# Patient Record
Sex: FEMALE | Race: WHITE
[De-identification: names, ages, dates, MRNs, and addresses within clinical notes are randomized per-mention and may not be internally consistent; named-entity substitution may affect disease eponyms.]

---

## 2017-06-08 ENCOUNTER — HOSPITAL ENCOUNTER (INPATIENT)
Dept: HOSPITAL 11 - JP.ED | Age: 32
LOS: 3 days | Discharge: HOME | DRG: 221 | End: 2017-06-11
Attending: SURGERY | Admitting: SURGERY
Payer: COMMERCIAL

## 2017-06-08 DIAGNOSIS — Z87.19: ICD-10-CM

## 2017-06-08 DIAGNOSIS — K35.3: Primary | ICD-10-CM

## 2017-06-08 PROCEDURE — C9113 INJ PANTOPRAZOLE SODIUM, VIA: HCPCS

## 2017-06-08 PROCEDURE — S0073 INJECTION, AZTREONAM, 500 MG: HCPCS

## 2017-06-09 PROCEDURE — 0DTJ4ZZ RESECTION OF APPENDIX, PERCUTANEOUS ENDOSCOPIC APPROACH: ICD-10-PCS | Performed by: SURGERY

## 2017-06-09 PROCEDURE — 0DBH4ZZ EXCISION OF CECUM, PERCUTANEOUS ENDOSCOPIC APPROACH: ICD-10-PCS | Performed by: SURGERY

## 2017-06-09 PROCEDURE — 0W9G4ZX DRAINAGE OF PERITONEAL CAVITY, PERCUTANEOUS ENDOSCOPIC APPROACH, DIAGNOSTIC: ICD-10-PCS | Performed by: SURGERY

## 2017-06-09 RX ADMIN — AZTREONAM SCH MLS/HR: 1 INJECTION, SOLUTION INTRAVENOUS at 14:53

## 2017-06-09 RX ADMIN — AMPICILLIN SODIUM AND SULBACTAM SODIUM SCH MLS/HR: 2; 1 INJECTION, POWDER, FOR SOLUTION INTRAMUSCULAR; INTRAVENOUS at 12:03

## 2017-06-09 RX ADMIN — AMPICILLIN SODIUM AND SULBACTAM SODIUM SCH MLS/HR: 2; 1 INJECTION, POWDER, FOR SOLUTION INTRAMUSCULAR; INTRAVENOUS at 18:03

## 2017-06-09 RX ADMIN — AZTREONAM SCH MLS/HR: 1 INJECTION, SOLUTION INTRAVENOUS at 21:20

## 2017-06-09 NOTE — EDM.PDOC
ED HPI GENERAL MEDICAL PROBLEM





- General


Chief Complaint: Abdominal Pain


Stated Complaint: RT LOWER ABD PAIN


Time Seen by Provider: 06/09/17 00:50


Source of Information: Reports: Patient


History Limitations: Reports: No Limitations





- History of Present Illness


INITIAL COMMENTS - FREE TEXT/NARRATIVE: 





pt started having rt lower abdomnal pain this am. This has been persistent 

during the day. She has vomited once just prior  to arrival. She had a normal 

bm today. She does have a history of chrons but has not had any recent problem. 


Onset: Today


Duration: Hour(s):, Getting Worse


Location: Reports: Abdomen


Quality: Reports: Sharp, Stabbing


Severity: Moderate


Associated Symptoms: Reports: No Other Symptoms, Nausea/Vomiting


  ** Right Lower Abdomen


Pain Score (Numeric/FACES): 5





- Related Data


 Allergies











Allergy/AdvReac Type Severity Reaction Status Date / Time


 


No Known Allergies Allergy   Verified 02/17/16 10:29











Home Meds: 


 Home Meds





traMADol [Ultram] 10 mg PO Q6H PRN 06/09/17 [History]











Past Medical History


Gastrointestinal History: Reports: Other (See Below)


Other Gastrointestinal History: Crohns disease


OB/GYN History: Reports: Pregnancy





- Infectious Disease History


Infectious Disease History: Reports: Chicken Pox





- Past Surgical History


HEENT Surgical History: Reports: Adenoidectomy, Oral Surgery, Tonsillectomy





Social & Family History





- Tobacco Use


Smoking Status *Q: Never Smoker


Second Hand Smoke Exposure: No





- Caffeine Use


Caffeine Use: Reports: Coffee





- Alcohol Use


Days Per Week of Alcohol Use: 0


Date of Last Drink: 05/31/17





- Recreational Drug Use


Recreational Drug Use: No





ED ROS GENERAL





- Review of Systems


Review Of Systems: See Below


Constitutional: Reports: Other (pt has not spiked a temp. She does have a 99 

temp tonight. )


HEENT: Reports: No Symptoms


Respiratory: Reports: No Symptoms


Cardiovascular: Reports: No Symptoms


Endocrine: Reports: No Symptoms


GI/Abdominal: Reports: Abdominal Pain, Nausea, Vomiting, Other (pt has pain in 

the rt lower abdoman. )


: Reports: No Symptoms


Musculoskeletal: Reports: No Symptoms


Skin: Reports: No Symptoms





ED EXAM, GI/ABD





- Physical Exam


Exam: See Below


Text/Narrative:: 





pt arrived with rt lower abdomanal pain.  She has vomited once. She states this 

started this am and has gotten worse. 


Exam Limited By: No Limitations


General Appearance: Alert, Moderate Distress


Eyes: Bilateral: Normal Appearance, EOMI


Ears: Normal TMs


Nose: Normal Inspection


Throat/Mouth: Normal Inspection


Head: Atraumatic


Neck: Normal Inspection


Respiratory/Chest: No Respiratory Distress


Cardiovascular: Regular Rate, Rhythm


GI/Abdominal: Tenderness, Rebound


 (Female) Exam: Deferred


Rectal (Female) Exam: Deferred


Back Exam: Normal Inspection


Extremities: Normal Inspection


Neurological: Alert, Oriented, Normal Cognition


Psychiatric: Normal Affect





Course





- Vital Signs


Last Recorded V/S: 


 Last Vital Signs











Temp  37.3 C   06/09/17 00:43


 


Pulse  92   06/09/17 03:05


 


Resp  16   06/09/17 03:05


 


BP  131/76   06/09/17 03:05


 


Pulse Ox  94 L  06/09/17 03:05














- Orders/Labs/Meds


Orders: 


 Active Orders 24 hr











 Category Date Time Status


 


 Abdomen Pelvis w Cont [CT] Stat Exams  06/09/17 01:29 Ordered


 


 Sodium Chloride 0.9% [Normal Saline] 1,000 ml Med  06/09/17 01:15 Active





 IV ASDIRECTED   


 


 Sodium Chloride 0.9% [Normal Saline] 1,000 ml Med  06/09/17 03:00 Active





 IV ASDIRECTED   








 Medication Orders





Sodium Chloride (Normal Saline)  1,000 mls @ 999 mls/hr IV ASDIRECTED CHEPE


   Last Admin: 06/09/17 01:38  Dose: 999 mls/hr


Sodium Chloride (Normal Saline)  1,000 mls @ 250 mls/hr IV ASDIRECTED CHEPE


   Last Admin: 06/09/17 02:59  Dose: 250 mls/hr








Labs: 


 Laboratory Tests











  06/09/17 06/09/17 06/09/17 Range/Units





  00:34 00:34 00:34 


 


WBC   14.9 H   (4.5-11.0)  K/uL


 


RBC   4.41   (3.30-5.50)  M/uL


 


Hgb   12.4   (12.0-15.0)  g/dL


 


Hct   36.7   (36.0-48.0)  %


 


MCV   83   (80-98)  fL


 


MCH   28   (27-31)  pg


 


MCHC   34   (32-36)  %


 


Plt Count   198   (150-400)  K/uL


 


Neut % (Auto)   80 H   (36-66)  %


 


Lymph % (Auto)   9 L   (24-44)  %


 


Mono % (Auto)   10 H   (2-6)  %


 


Eos % (Auto)   1 L   (2-4)  %


 


Baso % (Auto)   0   (0-1)  %


 


Sodium    138 L  (140-148)  mmol/L


 


Potassium    3.7  (3.6-5.2)  mmol/L


 


Chloride    105  (100-108)  mmol/L


 


Carbon Dioxide    25  (21-32)  mmol/L


 


Anion Gap    11.7  (5.0-14.0)  mmol/L


 


BUN    12  (7-18)  mg/dL


 


Creatinine    0.7  (0.6-1.0)  mg/dL


 


Est Cr Clr Drug Dosing    99.63  mL/min


 


Estimated GFR (MDRD)    > 60  (>60)  


 


Glucose    114 H  ()  mg/dL


 


Calcium    8.3 L  (8.5-10.1)  mg/dL


 


Total Bilirubin    0.6  (0.2-1.0)  mg/dL


 


AST    11 L  (15-37)  U/L


 


ALT    15  (12-78)  U/L


 


Alkaline Phosphatase    45 L  ()  U/L


 


C-Reactive Protein  3.53 H    (0.0-0.3)  mg/dL


 


Total Protein    7.2  (6.4-8.2)  g/dL


 


Albumin    3.6  (3.4-5.0)  g/dL


 


Globulin    3.6 H  (2.3-3.5)  g/dL


 


Albumin/Globulin Ratio    1.0 L  (1.2-2.2)  


 


Urine Color     


 


Urine Appearance     


 


Urine pH     (4.5-8.0)  


 


Ur Specific Gravity     (1.008-1.030)  


 


Urine Protein     (NEGATIVE)  mg/dL


 


Urine Glucose (UA)     (NEGATIVE)  mg/dL


 


Urine Ketones     (NEGATIVE)  mg/dL


 


Urine Occult Blood     (NEGATIVE)  


 


Urine Nitrite     (NEGATIVE)  


 


Urine Bilirubin     (NEGATIVE)  


 


Urine Urobilinogen     (NORMAL)  mg/dL


 


Ur Leukocyte Esterase     (NEGATIVE)  


 


Urine RBC     (0-5)  


 


Urine WBC     (0-5)  


 


Ur Epithelial Cells     


 


Amorphous Sediment     


 


Urine Bacteria     


 


Urine Mucus     


 


Urine HCG, Qual     














  06/09/17 06/09/17 Range/Units





  01:16 01:57 


 


WBC    (4.5-11.0)  K/uL


 


RBC    (3.30-5.50)  M/uL


 


Hgb    (12.0-15.0)  g/dL


 


Hct    (36.0-48.0)  %


 


MCV    (80-98)  fL


 


MCH    (27-31)  pg


 


MCHC    (32-36)  %


 


Plt Count    (150-400)  K/uL


 


Neut % (Auto)    (36-66)  %


 


Lymph % (Auto)    (24-44)  %


 


Mono % (Auto)    (2-6)  %


 


Eos % (Auto)    (2-4)  %


 


Baso % (Auto)    (0-1)  %


 


Sodium    (140-148)  mmol/L


 


Potassium    (3.6-5.2)  mmol/L


 


Chloride    (100-108)  mmol/L


 


Carbon Dioxide    (21-32)  mmol/L


 


Anion Gap    (5.0-14.0)  mmol/L


 


BUN    (7-18)  mg/dL


 


Creatinine    (0.6-1.0)  mg/dL


 


Est Cr Clr Drug Dosing    mL/min


 


Estimated GFR (MDRD)    (>60)  


 


Glucose    ()  mg/dL


 


Calcium    (8.5-10.1)  mg/dL


 


Total Bilirubin    (0.2-1.0)  mg/dL


 


AST    (15-37)  U/L


 


ALT    (12-78)  U/L


 


Alkaline Phosphatase    ()  U/L


 


C-Reactive Protein    (0.0-0.3)  mg/dL


 


Total Protein    (6.4-8.2)  g/dL


 


Albumin    (3.4-5.0)  g/dL


 


Globulin    (2.3-3.5)  g/dL


 


Albumin/Globulin Ratio    (1.2-2.2)  


 


Urine Color  Yellow   


 


Urine Appearance  Clear   


 


Urine pH  5.0   (4.5-8.0)  


 


Ur Specific Gravity  1.020   (1.008-1.030)  


 


Urine Protein  Negative   (NEGATIVE)  mg/dL


 


Urine Glucose (UA)  Normal   (NEGATIVE)  mg/dL


 


Urine Ketones  15 H   (NEGATIVE)  mg/dL


 


Urine Occult Blood  Negative   (NEGATIVE)  


 


Urine Nitrite  Negative   (NEGATIVE)  


 


Urine Bilirubin  Small   (NEGATIVE)  


 


Urine Urobilinogen  Normal   (NORMAL)  mg/dL


 


Ur Leukocyte Esterase  Small   (NEGATIVE)  


 


Urine RBC  0-5   (0-5)  


 


Urine WBC  0-5   (0-5)  


 


Ur Epithelial Cells  Moderate   


 


Amorphous Sediment  Not seen   


 


Urine Bacteria  Moderate   


 


Urine Mucus  Not seen   


 


Urine HCG, Qual   Negative  











Meds: 


Medications











Generic Name Dose Route Start Last Admin





  Trade Name Freq  PRN Reason Stop Dose Admin


 


Sodium Chloride  1,000 mls @ 999 mls/hr  06/09/17 01:15  06/09/17 01:38





  Normal Saline  IV   999 mls/hr





  ASDIRECTED CHEPE   Administration


 


Sodium Chloride  1,000 mls @ 250 mls/hr  06/09/17 03:00  06/09/17 02:59





  Normal Saline  IV   250 mls/hr





  ASDIRECTED CHEPE   Administration














Discontinued Medications














Generic Name Dose Route Start Last Admin





  Trade Name Howie  PRN Reason Stop Dose Admin


 


Hydromorphone HCl  0.5 mg  06/09/17 01:10  06/09/17 01:42





  Dilaudid  IVPUSH  06/09/17 01:11  0.5 mg





  ONETIME ONE   Administration


 


Hydromorphone HCl  0.5 mg  06/09/17 02:59  06/09/17 03:03





  Dilaudid  IVPUSH  06/09/17 03:00  0.5 mg





  ONETIME ONE   Administration


 


Sodium Chloride  79 mls @ 3.9 mls/sec  06/09/17 01:45  06/09/17 02:07





  Normal Saline  IV  06/09/17 01:46  3.9 mls/sec





  ASDIRECTED STA   Administration


 


Iopamidol  128 ml  06/09/17 01:45  06/09/17 02:07





  Isovue-300 (61%)  IV  06/09/17 01:46  150 ml





  .AS DIRECTED STA   Administration


 


Ondansetron HCl  4 mg  06/09/17 01:10  06/09/17 01:46





  Zofran  IVPUSH  06/09/17 01:11  4 mg





  ONETIME ONE   Administration














- Re-Assessments/Exams


Free Text/Narrative Re-Assessment/Exam: 





06/09/17 02:09


wbc is elevated at 14,900. Her crp is 3.45. 


06/09/17 03:20


cat scan revealed a acute apendicitis. Dr Melchor will do her first case in the 

am. 





Departure





- Departure


Time of Disposition: 03:21


Disposition: Admitted As Inpatient 65


Condition: fair


Clinical Impression: 


 Acute appendicitis








- Discharge Information


Forms:  ED Department Discharge


Care Plan Goals: 


admit to Dr Melchor





- My Orders


Last 24 Hours: 


My Active Orders





06/09/17 01:15


Sodium Chloride 0.9% [Normal Saline] 1,000 ml IV ASDIRECTED 





06/09/17 01:29


Abdomen Pelvis w Cont [CT] Stat 





06/09/17 03:00


Sodium Chloride 0.9% [Normal Saline] 1,000 ml IV ASDIRECTED 














- Assessment/Plan


Last 24 Hours: 


My Active Orders





06/09/17 01:15


Sodium Chloride 0.9% [Normal Saline] 1,000 ml IV ASDIRECTED 





06/09/17 01:29


Abdomen Pelvis w Cont [CT] Stat 





06/09/17 03:00


Sodium Chloride 0.9% [Normal Saline] 1,000 ml IV ASDIRECTED

## 2017-06-10 RX ADMIN — AMPICILLIN SODIUM AND SULBACTAM SODIUM SCH MLS/HR: 2; 1 INJECTION, POWDER, FOR SOLUTION INTRAMUSCULAR; INTRAVENOUS at 12:08

## 2017-06-10 RX ADMIN — HYDROCODONE BITARTRATE AND ACETAMINOPHEN PRN TAB: 5; 325 TABLET ORAL at 13:32

## 2017-06-10 RX ADMIN — HYDROCODONE BITARTRATE AND ACETAMINOPHEN PRN TAB: 5; 325 TABLET ORAL at 22:08

## 2017-06-10 RX ADMIN — HYDROCODONE BITARTRATE AND ACETAMINOPHEN PRN TAB: 5; 325 TABLET ORAL at 10:06

## 2017-06-10 RX ADMIN — AZTREONAM SCH MLS/HR: 1 INJECTION, SOLUTION INTRAVENOUS at 13:33

## 2017-06-10 RX ADMIN — AZTREONAM SCH MLS/HR: 1 INJECTION, SOLUTION INTRAVENOUS at 06:20

## 2017-06-10 RX ADMIN — AMPICILLIN SODIUM AND SULBACTAM SODIUM SCH MLS/HR: 2; 1 INJECTION, POWDER, FOR SOLUTION INTRAMUSCULAR; INTRAVENOUS at 23:17

## 2017-06-10 RX ADMIN — AZTREONAM SCH MLS/HR: 1 INJECTION, SOLUTION INTRAVENOUS at 22:10

## 2017-06-10 RX ADMIN — AMPICILLIN SODIUM AND SULBACTAM SODIUM SCH MLS/HR: 2; 1 INJECTION, POWDER, FOR SOLUTION INTRAMUSCULAR; INTRAVENOUS at 05:44

## 2017-06-10 RX ADMIN — AMPICILLIN SODIUM AND SULBACTAM SODIUM SCH MLS/HR: 2; 1 INJECTION, POWDER, FOR SOLUTION INTRAMUSCULAR; INTRAVENOUS at 18:36

## 2017-06-10 RX ADMIN — AMPICILLIN SODIUM AND SULBACTAM SODIUM SCH MLS/HR: 2; 1 INJECTION, POWDER, FOR SOLUTION INTRAMUSCULAR; INTRAVENOUS at 00:05

## 2017-06-10 NOTE — PN
DATE OF SERVICE:  06/10/2017

 

The patient has been afebrile with stable vital signs.  No nausea has been noted overnight.

We will begin a full-liquid diet today and advance to regular diet as tolerated.  Start some

Colace along with Dulcolax oral tablets.  She may be ready for discharge home tomorrow.

 

 

 

 

Tone Melchor MD

DD:  06/10/2017 08:53:03

DT:  06/10/2017 10:47:46

Job #:  [id number]

## 2017-06-11 VITALS — DIASTOLIC BLOOD PRESSURE: 74 MMHG | SYSTOLIC BLOOD PRESSURE: 117 MMHG

## 2017-06-11 RX ADMIN — HYDROCODONE BITARTRATE AND ACETAMINOPHEN PRN TAB: 5; 325 TABLET ORAL at 10:00

## 2017-06-11 RX ADMIN — AZTREONAM SCH MLS/HR: 1 INJECTION, SOLUTION INTRAVENOUS at 05:27

## 2017-06-11 RX ADMIN — AMPICILLIN SODIUM AND SULBACTAM SODIUM SCH MLS/HR: 2; 1 INJECTION, POWDER, FOR SOLUTION INTRAMUSCULAR; INTRAVENOUS at 05:26

## 2017-06-12 NOTE — DISCH
ADMISSION DIAGNOSIS:  Acute appendicitis.

 

DISCHARGE DIAGNOSES:  Laparoscopic appendectomy for acute appendicitis.

 

HISTORY:  Janette Liu developed right lower quadrant abdominal pain, presented to the

ER after preoperative evaluation, discussion of possible risks and possible complications.

She wished to proceed with some surgical procedure.

 

HOSPITAL COURSE:  Janette had her surgery on 05/09/2017.  She had no operative

complications.  On postop day 1, she was started on a diet and advanced as tolerated.  She

was changed to oral pain medication.  On postop day 2, she was able to be discharged to

home.  Vital signs were stable.  Activity was good and pain was well controlled.

 

OBJECTIVE:  GENERAL:  Janette Liu is a 31-year-old female.

VITAL SIGNS:  Height is 5 feet 4 inches.  Weight is 192 pounds.  TPR is 98.7, 86, 20, blood

pressure 117/74. HEENT:  Negative.

NECK:  Supple.

HEART:  Regular rate and rhythm.

LUNGS:  Clear.

ABDOMEN:  Incisions looked good.  MAN drain was draining a light serosanguineous drainage.

This was discontinued, 4x4s placed over MAN drain site.

EXTREMITIES:  Without peripheral edema.

 

DISPOSITION:  Discharged to home.

 

CONDITION:  Stable and improving.

 

FOLLOWUP APPOINTMENTS:  Amarilis Farr PA-C on 06/19/2017 at 10:00 a.m.

 

HOME MEDICATIONS:

1. Norco 5/325 mg 1 to 2 every 4 hours p.r.n. pain #50.

2. Augmentin 875 mg 1 tablet b.i.d. for 5 days.

3. Colace 100 mg oral twice daily.

4. Zofran 4 mg ODT q.4 hours p.r.n. nausea.

5. Discontinue taking the tramadol.

 

DIET AFTER DISCHARGE:  Usual diet as tolerated.  Drink 8 to 10 glasses of water a day.

 

ACTIVITY:  No lifting greater than 10 pounds for 2 weeks.  Driving, do not drive on pain

medication.  May shower.  Notify provider if any fever, increased pain, nausea, or vomiting.

Wound incision care.  Keep site clean and dry.  Wear

abdominal binder for 2 weeks and as tolerated.  Special instruction, use incentive

spirometer 10 times every hour while awake for 2 weeks.

## 2017-06-14 NOTE — OR
DATE OF PROCEDURE:  06/09/2017

 

PREOPERATIVE DIAGNOSIS:  Acute appendicitis.

 

POSTOPERATIVE DIAGNOSES:

1. Acute appendicitis with marked inflammation and necrosis of adjacent cecal base.

2. Pericolonic/periappendiceal abscess.

 

OPERATIVE PROCEDURE:  Diagnostic laparoscopy with:

1. Partial cecectomy with removal of attached overlying appendix (10319).

2. Drainage of pericolonic/periappendiceal abscess ([id number]).

 

ANESTHESIA:  General.

 

ASSISTANT:

1. Amarilis Farr PA-C.

2. LUIS Leo student.

 

INDICATIONS FOR PROCEDURE:  This is a 31-year-old presenting overnight with a picture of

acute appendicitis.  A CT scan confirmed an inflamed appendicitis.  Plan is to proceed with

a diagnostic laparoscopy, laparotomy if needed and appendectomy with other procedures as

indicated.  Potential risks including bleeding, infection, leaks from various GI tract

closures such as appendectomy stump, possibility of needing additional procedures beyond

appendectomy depending on operative findings, as well as possibility of cardiopulmonary,

septic, or hemorrhagic complications leading to death were all discussed, and the patient

wishes to proceed.

 

DETAILS OF THE PROCEDURE:  The patient was taken to the operating room and placed in the

supine position.  After general endotracheal anesthesia was induced, a De Anda catheter was

inserted and the abdomen was prepped and draped.  Three fingerbreadths superior into the

left subxiphoid process, a transverse incision was made and the peritoneal cavity entered

under direct vision with Optiview trocar inflated to 15 mmHg pressure with CO2.  Laparoscope

was then reinserted and no underlying trocar insertion site injuries were seen.  Following

this, 12-mm trocar was placed in the right upper quadrant as well as left lower quadrant and

the abdomen examined.  The patient was having an acute appendicitis.  There was some

purulent fluid adjacent to the appendix as well as the cecum.  As one dissected, it became

evident there was some necrosis of the appendix, which extended upward onto include the

cecum.  Given this, a decision was made to proceed with a partial cecectomy in order to

provide a more satisfactory layer of tissue for the closure.  The mesoappendix was then

divided with Harmonic scalpel and this then allowed upward mobility of the appendix and the

cecum was then divided roughly 2 cm away from the appendiceal base with a ELINA purple load.

The staple line appeared to be nicely intact and hemostatic at this point.  The specimen was

placed into the specimen container and brought out through the left lower quadrant trocar

site.  During the course of the dissection, the purulent material adjacent to the cecum was

then evacuated.  Cultures of this were obtained, and at this point, no further problems were

noted.

 

Jerod-Driscoll drain was then taken to the small 5-mm trocar site, placed in the right flank,

and draped across appendiceal stump and from there into the pelvis.  The trocars were

sequentially removed and the fascia at each site closed with 0 Vicryl stitch and the skin

with 6-0 Vicryl skin stitch.  It was felt reasonable to close the left lower quadrant trocar

site as the specimen had been delivered through the specimen bag without any contact to the

soft tissues during that process.

 

The drain incision was closed with some 4-0 Vicryl skin stitch and dressing was applied.

The patient was taken to the recovery room in satisfactory condition.

 

Physician assistant, Amarilis Farr PA-C, played an essential role in assisting in this case,

helping to position the patient, retract structures as needed, as well as suturing and

cutting the sutures as indicated.  Her presence improved the patient safety and decreased

operative time.

 

 

 

 

Tone Melchor MD

DD:  06/14/2017 07:25:29

DT:  06/14/2017 11:55:30

Job #:  [id number]

## 2019-03-27 ENCOUNTER — HOSPITAL ENCOUNTER (EMERGENCY)
Dept: HOSPITAL 11 - JP.ED | Age: 34
Discharge: HOME | End: 2019-03-27
Payer: COMMERCIAL

## 2019-03-27 VITALS — DIASTOLIC BLOOD PRESSURE: 86 MMHG | SYSTOLIC BLOOD PRESSURE: 131 MMHG

## 2019-03-27 DIAGNOSIS — K50.911: Primary | ICD-10-CM

## 2019-03-27 DIAGNOSIS — Z79.899: ICD-10-CM

## 2019-03-27 NOTE — EDM.PDOC
ED HPI GENERAL MEDICAL PROBLEM





- General


Chief Complaint: Gastrointestinal Problem


Stated Complaint: BLOODY STOOLS


Time Seen by Provider: 03/27/19 07:55


Source of Information: Reports: Patient, Old Records, RN


History Limitations: Reports: No Limitations





- History of Present Illness


INITIAL COMMENTS - FREE TEXT/NARRATIVE: 





32 yo female with known Crohn's Dz and who is not currently on any tx for this 

presents today after a couple of bloody stools today. Has been experiencing 

increasing abdominal pain over the past few months. Had a gastroenterologist in 

the past, but he left his position with [facility] and she never replaced 

him. Has a NP in the clinic locally for primary care, but had not contacted her 

recently about her increasing sx's. Is not dizzy with standing today. Bleeding 

described as moderate in volume. No other current sx's. 


Onset: Today (Bleeding is new today.)


Onset Date: 03/27/19


Onset Time: 06:30


Duration: Minutes:, Waxing/Waning


Location: Reports: Abdomen


Quality: Reports: Other (cramps)


Severity: Moderate


Improves with: Reports: None


Worsens with: Reports: Other (slowly over time)


Context: Reports: Other (Crohn's Dz)


Associated Symptoms: Reports: No Other Symptoms.  Denies: Fever/Chills, Nausea/

Vomiting


Treatments PTA: Reports: Other (see below) (none)





- Related Data


 Allergies











Allergy/AdvReac Type Severity Reaction Status Date / Time


 


No Known Allergies Allergy   Verified 03/27/19 07:46











Home Meds: 


 Home Meds





Ondansetron [Zofran ODT] 4 mg PO Q6H PRN #30 tab.dis 06/11/17 [Rx]


Ketorolac [Toradol] 10 mg PO ASDIRECTED PRN 03/27/19 [History]


predniSONE [Prednisone] 20 mg PO BID #14 tablet 03/27/19 [Rx]











Past Medical History


HEENT History: Reports: None


Gastrointestinal History: Reports: Chronic Constipation, Chronic Diarrhea, 

Other (See Below)


Other Gastrointestinal History: Crohns disease


OB/GYN History: Reports: Pregnancy





- Infectious Disease History


Infectious Disease History: Reports: Chicken Pox





- Past Surgical History


Head Surgeries/Procedures: Reports: None


HEENT Surgical History: Reports: Adenoidectomy, Oral Surgery, Tonsillectomy


GI Surgical History: Reports: Appendectomy, Hernia, Inguinal


Dermatological Surgical History: Reports: None





Social & Family History





- Tobacco Use


Smoking Status *Q: Never Smoker


Second Hand Smoke Exposure: No





- Caffeine Use


Caffeine Use: Reports: Coffee, Energy Drinks





- Recreational Drug Use


Recreational Drug Use: No





ED ROS GENERAL





- Review of Systems


Review Of Systems: See Below


Constitutional: Reports: No Symptoms


HEENT: Reports: No Symptoms


Respiratory: Reports: No Symptoms


Cardiovascular: Reports: No Symptoms


Endocrine: Reports: No Symptoms


GI/Abdominal: Reports: Abdominal Pain, Bloody Stool, Hematochezia.  Denies: 

Black Stool, Constipation, Diarrhea, Distension, Flatus, Hematemesis, Melena, 

Nausea, Vomiting


: Reports: No Symptoms


Musculoskeletal: Reports: No Symptoms


Skin: Reports: No Symptoms


Neurological: Reports: No Symptoms


Psychiatric: Reports: No Symptoms





ED EXAM, GI/ABD





- Physical Exam


Exam: See Below


Exam Limited By: No Limitations


General Appearance: Alert, WD/WN, No Apparent Distress


Eyes: Bilateral: Normal Appearance


Ears: Normal External Exam, Normal Canal, Hearing Grossly Normal, Normal TMs


Nose: Normal Inspection, No Blood


Throat/Mouth: Normal Inspection, Normal Lips, Normal Oropharynx, Normal Voice, 

No Airway Compromise


Head: Atraumatic, Normocephalic


Neck: Normal Inspection


Respiratory/Chest: No Respiratory Distress, Lungs Clear, Normal Breath Sounds, 

No Accessory Muscle Use


Cardiovascular: Regular Rate, Rhythm, No Edema


GI/Abdominal Exam: Normal Bowel Sounds, Soft, No Distention, Tender (mild, 

diffuse).  No: Non-Tender, Distended, Guarding, Rigid, Rebound, Hernia


Back Exam: Normal Inspection.  No: CVA Tenderness (R), CVA Tenderness (L)


Extremities: Normal Inspection, Normal Range of Motion, Non-Tender, No Pedal 

Edema


Neurological: Alert, Oriented, CN II-XII Intact, Normal Cognition, No Motor/

Sensory Deficits


Psychiatric: Normal Affect, Normal Mood


Skin Exam: Warm, Dry, Intact, Normal Color, No Rash





Course





- Vital Signs


Last Recorded V/S: 


 Last Vital Signs











Temp  35.7 C   03/27/19 07:45


 


Pulse  95   03/27/19 07:45


 


Resp  14   03/27/19 07:45


 


BP  131/86   03/27/19 07:45


 


Pulse Ox  98   03/27/19 07:45














Departure





- Departure


Time of Disposition: 08:20


Disposition: Home, Self-Care 01


Condition: Fair


Clinical Impression: 


 Acute Crohn's disease with rectal bleeding








- Discharge Information


*PRESCRIPTION DRUG MONITORING PROGRAM REVIEWED*: No


*COPY OF PRESCRIPTION DRUG MONITORING REPORT IN PATIENT SILVA: No


Referrals: 


Gabrielle Alberts PA [Primary Care Provider] - 


Forms:  ED Department Discharge


Additional Instructions: 


Take prednisone as directed. See your primary care provider within the week, 

you may need referral back to gastroenterology. Drink ample fluids. 

Acetaminophen as needed for pain relief.

## 2019-04-02 ENCOUNTER — HOSPITAL ENCOUNTER (EMERGENCY)
Dept: HOSPITAL 11 - JP.ED | Age: 34
Discharge: HOME | End: 2019-04-02
Payer: COMMERCIAL

## 2019-04-02 VITALS — DIASTOLIC BLOOD PRESSURE: 67 MMHG | SYSTOLIC BLOOD PRESSURE: 113 MMHG

## 2019-04-02 DIAGNOSIS — K50.011: Primary | ICD-10-CM

## 2019-04-02 DIAGNOSIS — Z79.899: ICD-10-CM

## 2019-04-02 PROCEDURE — 99284 EMERGENCY DEPT VISIT MOD MDM: CPT

## 2019-04-02 PROCEDURE — 74177 CT ABD & PELVIS W/CONTRAST: CPT

## 2019-04-02 PROCEDURE — 96374 THER/PROPH/DIAG INJ IV PUSH: CPT

## 2019-04-02 PROCEDURE — 36415 COLL VENOUS BLD VENIPUNCTURE: CPT

## 2019-04-02 PROCEDURE — 96361 HYDRATE IV INFUSION ADD-ON: CPT

## 2019-04-02 PROCEDURE — 83690 ASSAY OF LIPASE: CPT

## 2019-04-02 PROCEDURE — 83605 ASSAY OF LACTIC ACID: CPT

## 2019-04-02 PROCEDURE — 96375 TX/PRO/DX INJ NEW DRUG ADDON: CPT

## 2019-04-02 PROCEDURE — 81001 URINALYSIS AUTO W/SCOPE: CPT

## 2019-04-02 PROCEDURE — 85025 COMPLETE CBC W/AUTO DIFF WBC: CPT

## 2019-04-02 PROCEDURE — 80053 COMPREHEN METABOLIC PANEL: CPT

## 2019-04-02 PROCEDURE — 81025 URINE PREGNANCY TEST: CPT

## 2019-04-02 NOTE — CRLCT
INDICATION:



Pelvic pain. Bloody stools. History of Crohn`s disease. Prior appendectomy 

June 2017.



TECHNIQUE:



Contrast-enhanced abdominal pelvic CT. 135 cc nonionic Isovue-300 

administered.



COMPARISON:



June 9, 2017.



FINDINGS:



There is CT evidence for active Crohn`s disease. Specifically there are 

distal small bowel loops within the central abdomen and right lower 

quadrant which are thick-walled and enhance with an area of narrowing in 

the central lower abdomen, image 30 series 3 and image 100 series 2. 

Proximal to this area of narrowing there is small bowel dilatation and it 

is uncertain if there is not only active Crohn`s disease but perhaps an 

adhesive band/partial small-bowel obstruction. Clinical correlation 

recommended. Careful radiographic follow up recommended. Fat stranding in 

the lower abdomen/pelvis with minimal free pelvic fluid.



Additionally there is a 8 cm segment of sigmoid colon within the left lower 

quadrant image 112 series 2 which is thick-walled and edematous with 

pericolonic fluid/fat stranding. This is also likely related to Crohn`s 

disease and not diverticular disease as no diverticula are identified and 

the process is diffuse within this segment.



When compared with the previous study the appendix has been removed. There 

are no drainable fluid collections. No free air.



Clear included lung bases. The liver is negative for masses or biliary 

ductal dilatation. No splenomegaly. Normal-appearing pancreas, gallbladder, 

adrenal glands, kidneys, abdominal aorta, iliac arteries, and inferior vena 

cava. The uterus and urinary bladder are unremarkable. 2.1 cm right ovarian 

cyst. Postsurgical change right inguinal region likely from hernia repair. 

This was seen previously.



IMPRESSION:



1. There is CT evidence for active Crohn`s disease involving the distal 

small bowel and sigmoid colon as well.



2. There is an area of narrowing within the central lower abdomen small 

bowel loops which may simply reflect active Crohn`s disease. An adhesive 

band could not be entirely excluded. Proximal to that this is a dilated 

segment of small bowel.



3. Absent appendix. No drainable fluid collection. No free air.



Please note that all CT scans at this facility use dose modulation, 

iterative reconstruction, and/or weight-based dosing when appropriate to 

reduce radiation dose to as low as reasonably achievable.



Dictated by Hector Napoles MD @ Apr 2 2019  9:48AM



Signed by Dr. Hector Napoles @ Apr 2 2019  9:57AM

## 2019-04-02 NOTE — EDM.PDOC
ED HPI GENERAL MEDICAL PROBLEM





- General


Chief Complaint: Abdominal Pain


Stated Complaint: ABDOMINAL PAIN


Time Seen by Provider: 04/02/19 07:54


Source of Information: Reports: Patient, Family, Old Records, RN Notes Reviewed


History Limitations: Reports: No Limitations





- History of Present Illness


INITIAL COMMENTS - FREE TEXT/NARRATIVE: 





33-year-old female presents emergency department day complaint of abdominal pain

, she is known history of Crohn's disease as well as inguinal hernia repair and 

appendicitis. States she's had bloody stools for little over a week was 

evaluated emergency department about one week prior felt to be Crohn's related 

given steroid bursts minimal effect. She states the abdominal pain does not 

necessarily feel like a Crohn's flareup did have a fever last night up to 100.4 

no nausea vomiting no shortness breath chest pain


  ** Lower Abdomen


Pain Score (Numeric/FACES): 6





- Related Data


 Allergies











Allergy/AdvReac Type Severity Reaction Status Date / Time


 


No Known Allergies Allergy   Verified 04/02/19 07:42











Home Meds: 


 Home Meds





Ondansetron [Zofran ODT] 4 mg PO Q6H PRN #30 tab.dis 06/11/17 [Rx]


Ketorolac [Toradol] 10 mg PO ASDIRECTED PRN 03/27/19 [History]


Acetaminophen [Tylenol Extra Strength] 1,000 mg PO Q6H PRN 04/02/19 [History]


Budesonide [Budesonide ER] 9 mg PO DAILY #7 tabdr...er 04/02/19 [Rx]


predniSONE [Prednisone] 20 mg PO DAILY 04/02/19 [History]











Past Medical History


Gastrointestinal History: Reports: Chronic Constipation, Chronic Diarrhea, 

Inflammatory Bowel Disease, Other (See Below)


Other Gastrointestinal History: Crohns disease Recent flair up


OB/GYN History: Reports: Pregnancy





- Infectious Disease History


Infectious Disease History: Reports: Chicken Pox





- Past Surgical History


Head Surgeries/Procedures: Reports: None


HEENT Surgical History: Reports: Adenoidectomy, Oral Surgery, Tonsillectomy


GI Surgical History: Reports: Appendectomy, Hernia, Inguinal


Dermatological Surgical History: Reports: None





Social & Family History





- Tobacco Use


Smoking Status *Q: Never Smoker


Second Hand Smoke Exposure: No





- Caffeine Use


Caffeine Use: Reports: Energy Drinks, Soda





- Alcohol Use


Days Per Week of Alcohol Use: 0





- Recreational Drug Use


Recreational Drug Use: No





ED ROS GENERAL





- Review of Systems


Review Of Systems: See Below


Constitutional: Reports: Fever


HEENT: Reports: No Symptoms


Respiratory: Reports: No Symptoms


Cardiovascular: Reports: No Symptoms


GI/Abdominal: Reports: Abdominal Pain, Bloody Stool, Diarrhea, Flatus.  Denies: 

Nausea, Vomiting


: Reports: No Symptoms


Musculoskeletal: Reports: No Symptoms





ED EXAM, GI/ABD





- Physical Exam


Exam: See Below


Exam Limited By: No Limitations


General Appearance: Alert, WD/WN, No Apparent Distress


Respiratory/Chest: No Respiratory Distress, Lungs Clear, Normal Breath Sounds, 

No Accessory Muscle Use, Chest Non-Tender


Cardiovascular: No Murmur, Tachycardia


GI/Abdominal Exam: Soft, No Distention, Tender (Pelvic area tenderness), 

Abnormal Bowel Sounds (Decreased)


Extremities: Normal Inspection, Non-Tender, No Pedal Edema





Course





- Vital Signs


Last Recorded V/S: 


 Last Vital Signs











Temp  98.4 F   04/02/19 07:50


 


Pulse  115 H  04/02/19 07:50


 


Resp  16   04/02/19 07:50


 


BP  113/67   04/02/19 07:50


 


Pulse Ox  99   04/02/19 07:50














- Orders/Labs/Meds


Orders: 


 Active Orders 24 hr











 Category Date Time Status


 


 Peripheral IV Care [RC] .AS DIRECTED Care  04/02/19 08:05 Active


 


 Iopamidol [Isovue-300 (61%)] Med  04/02/19 09:09 Active





 135 ml IV .AS DIRECTED PRN   


 


 Lactated Ringers [Ringers, Lactated] 1,000 ml Med  04/02/19 08:15 Active





 IV ASDIRECTED   


 


 Sodium Chloride 0.9% [Normal Saline] 85 ml Med  04/02/19 09:15 Active





 IV ASDIRECTED   


 


 Sodium Chloride 0.9% [Saline Flush] Med  04/02/19 08:05 Active





 10 ml FLUSH ASDIRECTED PRN   


 


 Peripheral IV Insertion Adult [OM.PC] Urgent Oth  04/02/19 08:05 Ordered








 Medication Orders





Lactated Ringer's (Ringers, Lactated)  1,000 mls @ 999 mls/hr IV ASDIRECTED CHEPE


   Last Admin: 04/02/19 08:20  Dose: 999 mls/hr


Sodium Chloride (Normal Saline)  85 mls @ 3.5 mls/sec IV ASDIRECTED CHEPE


   Last Admin: 04/02/19 09:21  Dose: 3.5 mls/sec


Iopamidol (Isovue-300 (61%))  135 ml IV .AS DIRECTED PRN


   PRN Reason: RADIOLOGY EXAM


   Stop: 04/03/19 09:10


   Last Admin: 04/02/19 09:21  Dose: 135 ml


Sodium Chloride (Saline Flush)  10 ml FLUSH ASDIRECTED PRN


   PRN Reason: Keep Vein Open








Labs: 


 Laboratory Tests











  04/02/19 04/02/19 04/02/19 Range/Units





  08:20 08:20 08:20 


 


WBC  14.4 H    (4.5-11.0)  K/uL


 


RBC  3.34    (3.30-5.50)  M/uL


 


Hgb  9.0 L D    (12.0-15.0)  g/dL


 


Hct  29.4 L    (36.0-48.0)  %


 


MCV  88    (80-98)  fL


 


MCH  27    (27-31)  pg


 


MCHC  31 L    (32-36)  %


 


Plt Count  365    (150-400)  K/uL


 


Neut % (Auto)  78 H    (36-66)  %


 


Lymph % (Auto)  12 L    (24-44)  %


 


Mono % (Auto)  9 H    (2-6)  %


 


Eos % (Auto)  1 L    (2-4)  %


 


Baso % (Auto)  0    (0-1)  %


 


Sodium   138 L   (140-148)  mmol/L


 


Potassium   3.4 L   (3.6-5.2)  mmol/L


 


Chloride   103   (100-108)  mmol/L


 


Carbon Dioxide   28   (21-32)  mmol/L


 


Anion Gap   10.4   (5.0-14.0)  mmol/L


 


BUN   12   (7-18)  mg/dL


 


Creatinine   0.8   (0.6-1.0)  mg/dL


 


Est Cr Clr Drug Dosing   85.56   mL/min


 


Estimated GFR (MDRD)   > 60   (>60)  


 


Glucose   101   ()  mg/dL


 


Lactic Acid    1.3  (0.4-2.0)  mmol/L


 


Calcium   8.6   (8.5-10.1)  mg/dL


 


Total Bilirubin   0.5   (0.2-1.0)  mg/dL


 


AST   9 L   (15-37)  U/L


 


ALT   18   (12-78)  U/L


 


Alkaline Phosphatase   50   ()  U/L


 


Total Protein   7.0   (6.4-8.2)  g/dL


 


Albumin   3.4   (3.4-5.0)  g/dL


 


Globulin   3.6 H   (2.3-3.5)  g/dL


 


Albumin/Globulin Ratio   0.9 L   (1.2-2.2)  


 


Lipase   100   ()  U/L


 


Urine Color     


 


Urine Appearance     


 


Urine pH     (4.5-8.0)  


 


Ur Specific Gravity     (1.008-1.030)  


 


Urine Protein     (NEGATIVE)  mg/dL


 


Urine Glucose (UA)     (NEGATIVE)  mg/dL


 


Urine Ketones     (NEGATIVE)  mg/dL


 


Urine Occult Blood     (NEGATIVE)  


 


Urine Nitrite     (NEGATIVE)  


 


Urine Bilirubin     (NEGATIVE)  


 


Urine Urobilinogen     (NORMAL)  mg/dL


 


Ur Leukocyte Esterase     (NEGATIVE)  


 


Urine RBC     (0-5)  


 


Urine WBC     (0-5)  


 


Ur Epithelial Cells     


 


Amorphous Sediment     


 


Urine Bacteria     


 


Urine Mucus     


 


Urine HCG, Qual     














  04/02/19 04/02/19 Range/Units





  08:38 08:38 


 


WBC    (4.5-11.0)  K/uL


 


RBC    (3.30-5.50)  M/uL


 


Hgb    (12.0-15.0)  g/dL


 


Hct    (36.0-48.0)  %


 


MCV    (80-98)  fL


 


MCH    (27-31)  pg


 


MCHC    (32-36)  %


 


Plt Count    (150-400)  K/uL


 


Neut % (Auto)    (36-66)  %


 


Lymph % (Auto)    (24-44)  %


 


Mono % (Auto)    (2-6)  %


 


Eos % (Auto)    (2-4)  %


 


Baso % (Auto)    (0-1)  %


 


Sodium    (140-148)  mmol/L


 


Potassium    (3.6-5.2)  mmol/L


 


Chloride    (100-108)  mmol/L


 


Carbon Dioxide    (21-32)  mmol/L


 


Anion Gap    (5.0-14.0)  mmol/L


 


BUN    (7-18)  mg/dL


 


Creatinine    (0.6-1.0)  mg/dL


 


Est Cr Clr Drug Dosing    mL/min


 


Estimated GFR (MDRD)    (>60)  


 


Glucose    ()  mg/dL


 


Lactic Acid    (0.4-2.0)  mmol/L


 


Calcium    (8.5-10.1)  mg/dL


 


Total Bilirubin    (0.2-1.0)  mg/dL


 


AST    (15-37)  U/L


 


ALT    (12-78)  U/L


 


Alkaline Phosphatase    ()  U/L


 


Total Protein    (6.4-8.2)  g/dL


 


Albumin    (3.4-5.0)  g/dL


 


Globulin    (2.3-3.5)  g/dL


 


Albumin/Globulin Ratio    (1.2-2.2)  


 


Lipase    ()  U/L


 


Urine Color  Yellow   


 


Urine Appearance  Clear   


 


Urine pH  6.0   (4.5-8.0)  


 


Ur Specific Gravity  1.010   (1.008-1.030)  


 


Urine Protein  Negative   (NEGATIVE)  mg/dL


 


Urine Glucose (UA)  Normal   (NEGATIVE)  mg/dL


 


Urine Ketones  Negative   (NEGATIVE)  mg/dL


 


Urine Occult Blood  Negative   (NEGATIVE)  


 


Urine Nitrite  Negative   (NEGATIVE)  


 


Urine Bilirubin  Negative   (NEGATIVE)  


 


Urine Urobilinogen  Normal   (NORMAL)  mg/dL


 


Ur Leukocyte Esterase  Negative   (NEGATIVE)  


 


Urine RBC  0-5   (0-5)  


 


Urine WBC  0-5   (0-5)  


 


Ur Epithelial Cells  Few   


 


Amorphous Sediment  Not seen   


 


Urine Bacteria  Moderate   


 


Urine Mucus  Not seen   


 


Urine HCG, Qual   Negative  











Meds: 


Medications











Generic Name Dose Route Start Last Admin





  Trade Name Freq  PRN Reason Stop Dose Admin


 


Lactated Ringer's  1,000 mls @ 999 mls/hr  04/02/19 08:15  04/02/19 08:20





  Ringers, Lactated  IV   999 mls/hr





  ASDIRECTED CHEPE   Administration





     





     





     





     


 


Sodium Chloride  85 mls @ 3.5 mls/sec  04/02/19 09:15  04/02/19 09:21





  Normal Saline  IV   3.5 mls/sec





  ASDIRECTED CHEPE   Administration





     





     





     





     


 


Iopamidol  135 ml  04/02/19 09:09  04/02/19 09:21





  Isovue-300 (61%)  IV  04/03/19 09:10  135 ml





  .AS DIRECTED PRN   Administration





  RADIOLOGY EXAM   





     





     





     


 


Sodium Chloride  10 ml  04/02/19 08:05  





  Saline Flush  FLUSH   





  ASDIRECTED PRN   





  Keep Vein Open   





     





     





     














Discontinued Medications














Generic Name Dose Route Start Last Admin





  Trade Name Freq  PRN Reason Stop Dose Admin


 


Fentanyl  50 mcg  04/02/19 10:29  





  Sublimaze  IVPUSH  04/02/19 10:30  





  ONETIME ONE   





     





     





     





     


 


Ketorolac Tromethamine  30 mg  04/02/19 08:07  04/02/19 08:29





  Toradol  IVPUSH  04/02/19 08:08  30 mg





  ONETIME ONE   Administration





     





     





     





     


 


Methylprednisolone Sodium Succinate  125 mg  04/02/19 10:29  





  Solu-Medrol  IVPUSH  04/02/19 10:30  





  ONETIME ONE   





     





     





     





     














Departure





- Departure


Time of Disposition: 10:38


Disposition: Home, Self-Care 01


Condition: Fair


Clinical Impression: 


Crohns disease


Qualifiers:


 Gastrointestinal tract location: small intestine Digestive disease 

complication type: with rectal bleeding Qualified Code(s): K50.011 - Crohn's 

disease of small intestine with rectal bleeding








- Discharge Information


Prescriptions: 


Budesonide [Budesonide ER] 9 mg PO DAILY #7 tabdr...er


Referrals: 


Gabrielle Alberts PA [Primary Care Provider] - 


Forms:  ED Department Discharge


Additional Instructions: 


Plan is to take budesonide once a day for the next 4 weeks, start tomorrow stop 

your prednisone, we have set up a consultation with the gastroenterologist at 

[facility] they should contact you for a follow-up appointment

, please follow-up with your primary care the next 3-5 days for update and 

reevaluation, he medications have been faxed to [facility]





- My Orders


Last 24 Hours: 


My Active Orders





04/02/19 08:05


Peripheral IV Care [RC] .AS DIRECTED 


Sodium Chloride 0.9% [Saline Flush]   10 ml FLUSH ASDIRECTED PRN 


Peripheral IV Insertion Adult [OM.PC] Urgent 





04/02/19 08:15


Lactated Ringers [Ringers, Lactated] 1,000 ml IV ASDIRECTED 





04/02/19 09:09


Iopamidol [Isovue-300 (61%)]   135 ml IV .AS DIRECTED PRN 





04/02/19 09:15


Sodium Chloride 0.9% [Normal Saline] 85 ml IV ASDIRECTED 














- Assessment/Plan


Last 24 Hours: 


My Active Orders





04/02/19 08:05


Peripheral IV Care [RC] .AS DIRECTED 


Sodium Chloride 0.9% [Saline Flush]   10 ml FLUSH ASDIRECTED PRN 


Peripheral IV Insertion Adult [OM.PC] Urgent 





04/02/19 08:15


Lactated Ringers [Ringers, Lactated] 1,000 ml IV ASDIRECTED 





04/02/19 09:09


Iopamidol [Isovue-300 (61%)]   135 ml IV .AS DIRECTED PRN 





04/02/19 09:15


Sodium Chloride 0.9% [Normal Saline] 85 ml IV ASDIRECTED 











Plan: 





Assessment





Acuity = acute





Site and laterality = Crohn's flareup  





Etiology  = unknown etiology  





Manifestations = abdominal pain, fever, bloody diarrhea, anemia 





Location of injury =  Home





Lab values = WBC elevated 14.4 consistent leukocytosis, hemoglobin low at 9.0 

consistent normochromic anemia urinalysis unremarkable beta-hCG is negative CT 

scan reveals the following 1. There is CT evidence for active Crohn`s disease 

involving the distal small bowel and sigmoid colon as well.


2. There is an area of narrowing within the central lower abdomen small bowel 

loops which may simply reflect active Crohn`s disease. An adhesive band could 

not be entirely excluded. Proximal to that this is a dilated segment of small 

bowel.  





Plan


She did receive 125 mg Solu-Medrol IV 1, will start budesonide 9 mg once a day 

at least for the next 4 weeks and try and reduce this medication by 3 mg 

increments until she can be evaluated by GI. We did put in a consult requests 

for gastroenterologist at [facility] she has followed with GI 

in the past has been on Humira with good success however has not had a 

colonoscopy or visit with GI for over 12 years, will follow-up with primary 

care in the meantime 

















 This note was dictated using dragon voice recognition software please call 

with any questions on syntax or grammar.

## 2020-12-11 ENCOUNTER — HOSPITAL ENCOUNTER (EMERGENCY)
Dept: HOSPITAL 11 - JP.ED | Age: 35
Discharge: HOME | End: 2020-12-11
Payer: COMMERCIAL

## 2020-12-11 VITALS — HEART RATE: 97 BPM | SYSTOLIC BLOOD PRESSURE: 136 MMHG | DIASTOLIC BLOOD PRESSURE: 85 MMHG

## 2020-12-11 DIAGNOSIS — Z88.8: ICD-10-CM

## 2020-12-11 DIAGNOSIS — K50.011: Primary | ICD-10-CM

## 2020-12-11 DIAGNOSIS — K21.9: ICD-10-CM

## 2020-12-11 DIAGNOSIS — Z79.899: ICD-10-CM

## 2020-12-11 NOTE — EDM.PDOC
ED HPI GENERAL MEDICAL PROBLEM





- General


Chief Complaint: Abdominal Pain


Stated Complaint: ABDOMINAL PAIN


Time Seen by Provider: 12/11/20 08:52


Source of Information: Reports: Patient


History Limitations: Reports: No Limitations





- History of Present Illness


INITIAL COMMENTS - FREE TEXT/NARRATIVE: 





Patient presents for evaluation of lower abdominal pain beginning last night, 

similar to previous episodes where she has had a flare of known Crohn's disease.

 She has been seen by gastroenterology on several occasions and was put on a 

trial of oral budesonide after a visit here 18 months ago.  She eventually 

stopped it on her own because her insurance coverage for the medication resulted

in weekly expenditures of several $100.  Since then, she has done all right and 

has not had other significant flareups until just now.  Nothing in particular 

seems to set things off.  Overnight, she has used naproxen, Tylenol, SalonPas 

patches but has not really noticed a change in her pain.  She has vomited twice 

since symptoms began but nothing today.  Bowel movements are normal and not 

bloody or black.  No fever or chills.  To her, this feels like what she is 

accustomed to with previous Crohn's flares.


Onset Date: 12/10/20


Location: Reports: Abdomen


Quality: Reports: Burning


Severity: Moderate


Improves with: Reports: None


Worsens with: Reports: Eating


Associated Symptoms: Reports: Nausea/Vomiting.  Denies: Diaphoresis, 

Fever/Chills


Treatments PTA: Reports: Acetaminophen, NSAIDS, Other (see below) (Topical 

analgesic patches.)





- Related Data


                                    Allergies











Allergy/AdvReac Type Severity Reaction Status Date / Time


 


adalimumab [From Humira] Allergy  Anaphylactic Verified 12/11/20 08:27





   Shock  


 


infliximab-dyyb Allergy  Anaphylactic Verified 12/11/20 08:27





[From Inflectra]   Shock  


 


ustekinumab [From Stelara] Allergy  Anaphylactic Verified 12/11/20 08:27





   Shock  











Home Meds: 


                                    Home Meds





Ondansetron [Zofran ODT] 4 mg PO Q6H PRN #30 tab.dis 06/11/17 [Rx]


Cholecalciferol (Vitamin D3) [Vitamin D3] 1 tab PO DAILY 12/11/20 [History]


Omeprazole 1 tab PO DAILY 12/11/20 [History]











Past Medical History


HEENT History: Reports: None


Gastrointestinal History: Reports: Chronic Constipation, Chronic Diarrhea, 

Inflammatory Bowel Disease, Other (See Below)


Other Gastrointestinal History: Crohns disease Recent flair up


OB/GYN History: Reports: Pregnancy





- Infectious Disease History


Infectious Disease History: Reports: Chicken Pox





- Past Surgical History


Head Surgeries/Procedures: Reports: None


HEENT Surgical History: Reports: Adenoidectomy, Oral Surgery, Tonsillectomy


GI Surgical History: Reports: Appendectomy, Hernia, Inguinal


Dermatological Surgical History: Reports: None





Social & Family History





- Tobacco Use


Tobacco Use Status *Q: Never Tobacco User





- Caffeine Use


Caffeine Use: Reports: Energy Drinks, Soda





ED ROS GENERAL





- Review of Systems


Review Of Systems: See Below


Constitutional: Reports: No Symptoms


HEENT: Reports: No Symptoms


Respiratory: Reports: No Symptoms


Cardiovascular: Reports: No Symptoms


GI/Abdominal: Reports: Abdominal Pain (Broad, lower abdominal pain.), Vomiting 

(2 episodes as described but none this morning.).  Denies: Black Stool, Bloody 

Stool, Constipation, Diarrhea, Nausea


: Reports: No Symptoms


Musculoskeletal: Reports: No Symptoms





ED EXAM, GI/ABD





- Physical Exam


Exam: See Below


Text/Narrative:: 





This is an adult female interviewed in room 4.  She is sitting up while we talk 

and that seems to be a position of comfort for her.  She is not in acute severe 

distress.


Exam Limited By: No Limitations


General Appearance: Alert


Respiratory/Chest: No Respiratory Distress


Cardiovascular: Regular Rate, Rhythm (Borderline tachycardic.)


GI/Abdominal Exam: Soft, Tender (Pain on palpation over the lower 1/3-1/2 of the

 abdomen diffusely.), Abnormal Bowel Sounds (Slightly decreased in frequency.). 

 No: Distended, Guarding, Rigid


Back Exam: Normal Inspection


Extremities: Normal Inspection





Course





- Vital Signs


Last Recorded V/S: 


                                Last Vital Signs











Temp  36.2 C   12/11/20 08:37


 


Pulse  97   12/11/20 08:37


 


Resp      


 


BP  136/85   12/11/20 08:37


 


Pulse Ox  99   12/11/20 08:37














- Orders/Labs/Meds


Labs: 


                                Laboratory Tests











  12/11/20 12/11/20 Range/Units





  09:17 09:17 


 


WBC  9.6   (4.5-11.0)  K/uL


 


RBC  5.04   (3.30-5.50)  M/uL


 


Hgb  13.7  D   (12.0-15.0)  g/dL


 


Hct  42.0   (36.0-48.0)  %


 


MCV  83   (80-98)  fL


 


MCH  27   (27-31)  pg


 


MCHC  33   (32-36)  %


 


Plt Count  280   (150-400)  K/uL


 


Neut % (Auto)  77 H   (36-66)  %


 


Lymph % (Auto)  14 L   (24-44)  %


 


Mono % (Auto)  7 H   (2-6)  %


 


Eos % (Auto)  1 L   (2-4)  %


 


Baso % (Auto)  0   (0-1)  %


 


Sodium   139 L  (140-148)  mmol/L


 


Potassium   4.2  (3.6-5.2)  mmol/L


 


Chloride   103  (100-108)  mmol/L


 


Carbon Dioxide   24  (21-32)  mmol/L


 


Anion Gap   16.2 H  (5.0-14.0)  mmol/L


 


BUN   11  (7-18)  mg/dL


 


Creatinine   0.9  (0.6-1.0)  mg/dL


 


Est Cr Clr Drug Dosing   74.62  mL/min


 


Estimated GFR (MDRD)   > 60  (>60)  


 


Glucose   108 H  ()  mg/dL


 


Calcium   8.4 L  (8.5-10.1)  mg/dL


 


Total Bilirubin   0.4  (0.2-1.0)  mg/dL


 


AST   13 L  (15-37)  U/L


 


ALT   24  (12-78)  U/L


 


Alkaline Phosphatase   49  ()  U/L


 


C-Reactive Protein   0.42 H  (0.0-0.3)  mg/dL


 


Total Protein   7.4  (6.4-8.2)  g/dL


 


Albumin   4.0  (3.4-5.0)  g/dL


 


Globulin   3.4  (2.3-3.5)  g/dL


 


Albumin/Globulin Ratio   1.2  (1.2-2.2)  


 


Lipase   94  ()  U/L














- Re-Assessments/Exams


Free Text/Narrative Re-Assessment/Exam: 





12/11/20 09:19


I discussed that we will check some screening labs to see how things look today.

  Given her abdominal exam, I do not believe that imaging studies will change my

 course of action.  She has not seen gastroenterology since March of this year, 

just before the Covid crisis arises.  Visits with a number of gastroenterologist

 have been virtual visits, not face-to-face, which is what she would prefer to 

do.  She is thinking she probably will return to the [facility] gastroenterologist

 she saw this spring but hopes that she is able to do the visit in person.  When

 she has had flareups in the past, she typically would be on a prednisone taper 

over several weeks.  We likely will end up doing that unless there are 

indications for other testing as part of the evaluation.  She agrees with the 

plan.


12/11/20 17:12


I returned later to review test results which are essentially unrevealing.  

Given that her exam shows a soft but tender lower abdomen, this could be viral 

gastroenteritis or Crohn's disease flare up.  She relates again that this has 

the feel of her previous Crohn's flares.  She was sent with a prescription for 

prednisone 40 mg daily x7 days.  She needs to schedule a clinic recheck 

appointment with her primary care team for 1 week from now, or sooner.  If 

things worsen in any way, she should return here and we would need to do CT 

scanning at that point.  After discussion with the patient, she is in agreement 

with the nonscan approach to her condition today.  She was discharged in stable 

condition.





Departure





- Departure


Time of Disposition: 10:29


Disposition: Home, Self-Care 01


Condition: Good


Clinical Impression: 


Abdominal pain


Qualifiers:


 Abdominal location: lower abdomen, unspecified Qualified Code(s): R10.30 - 

Lower abdominal pain, unspecified





Crohns disease


Qualifiers:


 Gastrointestinal tract location: small intestine Digestive disease complication

 type: with rectal bleeding Qualified Code(s): K50.011 - Crohn's disease of 

small intestine with rectal bleeding








- Discharge Information


Instructions:  Short Bowel Syndrome


Referrals: 


Gabrielle Alberts PA [Primary Care Provider] - 


Forms:  ED Department Discharge


Additional Instructions: 


Begin prednisone today.  You are being given a prescription to take 40 mg daily 

for the next week.  Make an appointment to follow-up with primary care by the 

end of next week.  If you are feeling better, they could determine at that point

whether you would need additional prednisone or not.  You are also given a 

prescription for 6 tablets of hydrocodone 5/325 mg to take as needed.  Return to

ER if feeling worse in any way.





Sepsis Event Note (ED)





- Evaluation


Sepsis Screening Result: No Definite Risk





- Focused Exam


Vital Signs: 


                                   Vital Signs











  Temp Pulse BP Pulse Ox


 


 12/11/20 08:37  36.2 C  97  136/85  99
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
## 2021-02-19 NOTE — CR
Tibia Fibula Lt

 

CLINICAL HISTORY: Trauma

 

FINDINGS: Limited portable study of the left tib-fib shows a curvilinear lucency

through the tibial plateau from the intercondylar eminence suggesting

nondisplaced fracture. There is soft tissue disruption near the patellar tendon.

 

IMPRESSION: Nondisplaced fracture through the lateral tibial plateau and

intracondylar eminence

 

Soft tissue injury to the patellar tendon

## 2021-02-19 NOTE — CR
Femur Min 2V Lt

 

CLINICAL HISTORY: Trauma

 

FINDINGS: The femur appears intact. There is soft tissue disruption just above

the patella involving the quadriceps muscle and tendon. There is also

irregularity in the contour of the patellar tendon. Patellofemoral may be

slightly cephalic in position though the leg is straight.

 

IMPRESSION: Probable disruption of the quadriceps tendon and/ or patellar

tendons.

 

CT or MRI are considerations when patient's condition allows

## 2021-11-19 NOTE — OR
DATE OF PROCEDURE:  11/19/2021

 

SURGEON:  Nikhil Brito MD

 

PROCEDURE:

1. Esophagogastroduodenoscopy.

2. Colonoscopy.

 

FINDINGS:

1. Inflammation at GE junction concerning for reflux disease (biopsied using cold biopsy

    forceps).

2. Random biopsies performed due to Crohn disease and a very mild inflammation of the

    ascending, sigmoid, and rectum.

 

COMPLICATION:  None.

 

ASSISTANT:  None.

 

ANESTHESIA:  MAC.

 

PREOPERATIVE DIAGNOSIS:  Crohn disease.

 

POSTOPERATIVE DIAGNOSIS:  Crohn disease.

 

RISKS:  Risks, benefits, alternatives, and limitations including, but not limited to

infection, bleeding, perforation, false positive, and false negatives were explained to the

patient and they wished to proceed.

 

PROCEDURE IN DETAIL:  The patient was placed in left lateral decubitus position.  The EGD

scope was introduced and advanced atraumatically to the second part of the duodenum.  No

evidence of duodenitis or ulceration.  Within the stomach itself, there was no gastritis.

No hiatal hernia.

 

The GE junction had inflammation concerning for reflux disease, biopsied in all 4 quadrants

using cold biopsy forceps.  The remaining esophagus was inspected without abnormality.

 

Digital rectal exam was performed.  Scope was advanced atraumatically to the ileocecal

valve.  The scope was brought back to the ascending, transverse, and descending colon and

retroflexed.  In addition, the ileocecal valve was cannulated, no evidence of inflammation

was noted.  Random biopsies were performed of the very mild inflammation in a few

locations.  No abnormalities on retroflexion.  Greater than 8 minutes was spent removing the

scope.  The prep was acceptable, approximately 90% of luminal surface could be seen.  The

patient tolerated procedure well.

 

 

 

 

Nikhil Brito MD

DD:  11/19/2021 09:36:39

DT:  11/19/2021 10:19:48

Job #:  [id number]